# Patient Record
Sex: FEMALE | Race: BLACK OR AFRICAN AMERICAN | URBAN - METROPOLITAN AREA
[De-identification: names, ages, dates, MRNs, and addresses within clinical notes are randomized per-mention and may not be internally consistent; named-entity substitution may affect disease eponyms.]

---

## 2018-02-21 ENCOUNTER — HISTORICAL (OUTPATIENT)
Dept: LAB | Facility: HOSPITAL | Age: 48
End: 2018-02-21

## 2018-02-21 LAB
INR PPP: 3.2 (ref 2–3)
PROTHROMBIN TIME: 32.6 SECOND(S) (ref 9.3–11.4)

## 2018-03-21 ENCOUNTER — HISTORICAL (OUTPATIENT)
Dept: LAB | Facility: HOSPITAL | Age: 48
End: 2018-03-21

## 2018-03-21 LAB
INR PPP: 3 (ref 2–3)
PROTHROMBIN TIME: 28.9 SECOND(S) (ref 9.3–11.4)

## 2018-04-18 ENCOUNTER — HISTORICAL (OUTPATIENT)
Dept: LAB | Facility: HOSPITAL | Age: 48
End: 2018-04-18

## 2018-04-18 LAB
INR PPP: 2.5 (ref 2–3)
PROTHROMBIN TIME: 23.5 SECOND(S) (ref 9.3–11.4)

## 2018-05-14 ENCOUNTER — HISTORICAL (OUTPATIENT)
Dept: LAB | Facility: HOSPITAL | Age: 48
End: 2018-05-14

## 2018-05-14 LAB
INR PPP: 3.7 (ref 2–3)
PROTHROMBIN TIME: 37.1 SECOND(S) (ref 9.3–11.4)

## 2018-05-22 ENCOUNTER — HISTORICAL (OUTPATIENT)
Dept: LAB | Facility: HOSPITAL | Age: 48
End: 2018-05-22

## 2018-05-22 LAB
INR PPP: 4 (ref 2–3)
PROTHROMBIN TIME: 38.8 SECOND(S) (ref 9.3–11.4)

## 2018-05-30 ENCOUNTER — HISTORICAL (OUTPATIENT)
Dept: LAB | Facility: HOSPITAL | Age: 48
End: 2018-05-30

## 2018-05-30 LAB
INR PPP: 3.7 (ref 2–3)
PROTHROMBIN TIME: 35.3 SECOND(S) (ref 9.3–11.4)

## 2018-06-04 ENCOUNTER — HISTORICAL (OUTPATIENT)
Dept: LAB | Facility: HOSPITAL | Age: 48
End: 2018-06-04

## 2018-06-04 LAB
INR PPP: 3.5 (ref 2–3)
PROTHROMBIN TIME: 33.3 SECOND(S) (ref 9.3–11.4)

## 2018-06-14 ENCOUNTER — HISTORICAL (OUTPATIENT)
Dept: LAB | Facility: HOSPITAL | Age: 48
End: 2018-06-14

## 2018-06-14 LAB
INR PPP: 2.5 (ref 2–3)
PROTHROMBIN TIME: 24.1 SECOND(S) (ref 9.3–11.4)

## 2018-06-29 ENCOUNTER — HISTORICAL (OUTPATIENT)
Dept: LAB | Facility: HOSPITAL | Age: 48
End: 2018-06-29

## 2018-06-29 LAB
INR PPP: 2.6 (ref 2–3)
PROTHROMBIN TIME: 25.2 SECOND(S) (ref 9.3–11.4)

## 2018-07-30 ENCOUNTER — HISTORICAL (OUTPATIENT)
Dept: LAB | Facility: HOSPITAL | Age: 48
End: 2018-07-30

## 2018-07-30 LAB
INR PPP: 3.1 (ref 2–3)
PROTHROMBIN TIME: 29.1 SECOND(S) (ref 9.3–11.4)

## 2018-09-04 ENCOUNTER — HISTORICAL (OUTPATIENT)
Dept: LAB | Facility: HOSPITAL | Age: 48
End: 2018-09-04

## 2018-09-04 LAB
INR PPP: 2.8 (ref 2–3)
PROTHROMBIN TIME: 26.3 SECOND(S) (ref 9.3–11.4)

## 2018-10-02 ENCOUNTER — HISTORICAL (OUTPATIENT)
Dept: LAB | Facility: HOSPITAL | Age: 48
End: 2018-10-02

## 2018-10-02 LAB
INR PPP: 1.61 (ref 0–1.27)
PROTHROMBIN TIME: 19.6 SECOND(S) (ref 12.2–14.7)

## 2018-10-08 ENCOUNTER — HISTORICAL (OUTPATIENT)
Dept: LAB | Facility: HOSPITAL | Age: 48
End: 2018-10-08

## 2018-10-08 LAB
INR PPP: 3.59 (ref 0–1.27)
PROTHROMBIN TIME: 36.9 SECOND(S) (ref 12.2–14.7)

## 2018-10-16 ENCOUNTER — HISTORICAL (OUTPATIENT)
Dept: LAB | Facility: HOSPITAL | Age: 48
End: 2018-10-16

## 2018-10-16 LAB
INR PPP: 4.24 (ref 0–1.27)
PROTHROMBIN TIME: 42.1 SECOND(S) (ref 12.2–14.7)

## 2018-10-23 ENCOUNTER — HISTORICAL (OUTPATIENT)
Dept: LAB | Facility: HOSPITAL | Age: 48
End: 2018-10-23

## 2018-10-23 LAB
INR PPP: 2.77 (ref 0–1.27)
PROTHROMBIN TIME: 30.1 SECOND(S) (ref 12.2–14.7)

## 2018-11-01 ENCOUNTER — HISTORICAL (OUTPATIENT)
Dept: LAB | Facility: HOSPITAL | Age: 48
End: 2018-11-01

## 2018-11-01 LAB
INR PPP: 3.44 (ref 0–1.27)
PROTHROMBIN TIME: 35.7 SECOND(S) (ref 12.2–14.7)

## 2018-11-19 ENCOUNTER — HISTORICAL (OUTPATIENT)
Dept: LAB | Facility: HOSPITAL | Age: 48
End: 2018-11-19

## 2018-11-19 LAB
INR PPP: 2.6 (ref 0–1.3)
PROTHROMBIN TIME: 28.3 SECOND(S) (ref 12.2–14.7)

## 2018-12-26 ENCOUNTER — HISTORICAL (OUTPATIENT)
Dept: LAB | Facility: HOSPITAL | Age: 48
End: 2018-12-26

## 2018-12-26 LAB
INR PPP: 1.6 (ref 0–1.3)
PROTHROMBIN TIME: 19.4 SECOND(S) (ref 12.2–14.7)

## 2018-12-31 ENCOUNTER — HISTORICAL (OUTPATIENT)
Dept: LAB | Facility: HOSPITAL | Age: 48
End: 2018-12-31

## 2018-12-31 LAB
INR PPP: 3.8 (ref 0–1.3)
PROTHROMBIN TIME: 38.6 SECOND(S) (ref 12.2–14.7)

## 2019-01-02 ENCOUNTER — HISTORICAL (OUTPATIENT)
Dept: LAB | Facility: HOSPITAL | Age: 49
End: 2019-01-02

## 2019-01-02 LAB
INR PPP: 4.2 (ref 0–1.3)
PROTHROMBIN TIME: 41.8 SECOND(S) (ref 12.2–14.7)

## 2019-01-04 ENCOUNTER — HISTORICAL (OUTPATIENT)
Dept: LAB | Facility: HOSPITAL | Age: 49
End: 2019-01-04

## 2019-01-04 LAB
INR PPP: 2.2 (ref 0–1.3)
PROTHROMBIN TIME: 25.1 SECOND(S) (ref 12.2–14.7)

## 2019-01-14 ENCOUNTER — HISTORICAL (OUTPATIENT)
Dept: LAB | Facility: HOSPITAL | Age: 49
End: 2019-01-14

## 2019-01-14 LAB
INR PPP: 3.2 (ref 0–1.3)
PROTHROMBIN TIME: 33.2 SECOND(S) (ref 12.2–14.7)

## 2019-01-30 ENCOUNTER — HISTORICAL (OUTPATIENT)
Dept: LAB | Facility: HOSPITAL | Age: 49
End: 2019-01-30

## 2019-01-30 LAB
INR PPP: 3 (ref 0–1.3)
PROTHROMBIN TIME: 31.6 SECOND(S) (ref 12.2–14.7)

## 2019-02-15 ENCOUNTER — HISTORICAL (OUTPATIENT)
Dept: LAB | Facility: HOSPITAL | Age: 49
End: 2019-02-15

## 2019-02-15 LAB
INR PPP: 2.1 (ref 0–1.3)
PROTHROMBIN TIME: 24 SECOND(S) (ref 12.2–14.7)

## 2019-02-25 ENCOUNTER — HISTORICAL (OUTPATIENT)
Dept: LAB | Facility: HOSPITAL | Age: 49
End: 2019-02-25

## 2019-02-25 LAB
INR PPP: 2.2 (ref 0–1.3)
PROTHROMBIN TIME: 25.3 SECOND(S) (ref 12.2–14.7)

## 2019-03-06 ENCOUNTER — HISTORICAL (OUTPATIENT)
Dept: LAB | Facility: HOSPITAL | Age: 49
End: 2019-03-06

## 2019-03-06 LAB
INR PPP: 2.5 (ref 0–1.3)
PROTHROMBIN TIME: 27.7 SECOND(S) (ref 12.2–14.7)

## 2019-03-15 ENCOUNTER — HISTORICAL (OUTPATIENT)
Dept: LAB | Facility: HOSPITAL | Age: 49
End: 2019-03-15

## 2019-03-15 LAB
INR PPP: 2.6 (ref 0–1.3)
PROTHROMBIN TIME: 28 SECOND(S) (ref 12–14)

## 2019-03-29 ENCOUNTER — HISTORICAL (OUTPATIENT)
Dept: LAB | Facility: HOSPITAL | Age: 49
End: 2019-03-29

## 2019-03-29 LAB
INR PPP: 2.7 (ref 0–1.3)
PROTHROMBIN TIME: 29.6 SECOND(S) (ref 12–14)

## 2019-05-07 ENCOUNTER — HISTORICAL (OUTPATIENT)
Dept: LAB | Facility: HOSPITAL | Age: 49
End: 2019-05-07

## 2019-05-07 LAB
INR PPP: 1.8 (ref 0–1.3)
PROTHROMBIN TIME: 21.3 SECOND(S) (ref 12–14)

## 2019-05-17 ENCOUNTER — HISTORICAL (OUTPATIENT)
Dept: LAB | Facility: HOSPITAL | Age: 49
End: 2019-05-17

## 2019-05-17 LAB
INR PPP: 3.3 (ref 2–3)
PROTHROMBIN TIME: 33.6 SEC (ref 11.7–14.5)

## 2019-05-27 ENCOUNTER — HISTORICAL (OUTPATIENT)
Dept: LAB | Facility: HOSPITAL | Age: 49
End: 2019-05-27

## 2019-05-27 LAB
INR PPP: 3.4 (ref 2–3)
PROTHROMBIN TIME: 34.8 SEC (ref 11.7–14.5)

## 2019-06-06 ENCOUNTER — HISTORICAL (OUTPATIENT)
Dept: LAB | Facility: HOSPITAL | Age: 49
End: 2019-06-06

## 2019-06-06 LAB
INR PPP: 2.6 (ref 2–3)
PROTHROMBIN TIME: 28 SEC (ref 11.7–14.5)

## 2019-07-05 ENCOUNTER — HISTORICAL (OUTPATIENT)
Dept: LAB | Facility: HOSPITAL | Age: 49
End: 2019-07-05

## 2019-07-05 LAB
INR PPP: 3.5 (ref 2–3)
PROTHROMBIN TIME: 35.5 SEC (ref 11.7–14.5)

## 2019-08-06 ENCOUNTER — HISTORICAL (OUTPATIENT)
Dept: LAB | Facility: HOSPITAL | Age: 49
End: 2019-08-06

## 2019-08-06 LAB
INR PPP: 3.4 (ref 2–3)
PROTHROMBIN TIME: 34.7 SEC (ref 11.7–14.5)

## 2019-09-06 ENCOUNTER — HISTORICAL (OUTPATIENT)
Dept: LAB | Facility: HOSPITAL | Age: 49
End: 2019-09-06

## 2019-09-06 LAB
INR PPP: 2.1 (ref 2–3)
PROTHROMBIN TIME: 23.9 SEC (ref 11.7–14.5)

## 2019-09-13 ENCOUNTER — HISTORICAL (OUTPATIENT)
Dept: LAB | Facility: HOSPITAL | Age: 49
End: 2019-09-13

## 2019-09-13 LAB
INR PPP: 4.6 (ref 2–3)
PROTHROMBIN TIME: 43.8 SEC (ref 11.7–14.5)

## 2019-09-20 ENCOUNTER — HISTORICAL (OUTPATIENT)
Dept: LAB | Facility: HOSPITAL | Age: 49
End: 2019-09-20

## 2019-09-20 LAB
INR PPP: 3.7 (ref 2–3)
PROTHROMBIN TIME: 37.4 SEC (ref 11.7–14.5)

## 2019-10-08 ENCOUNTER — HISTORICAL (OUTPATIENT)
Dept: LAB | Facility: HOSPITAL | Age: 49
End: 2019-10-08

## 2019-10-08 LAB
INR PPP: 3.5 (ref 2–3)
PROTHROMBIN TIME: 35.4 SEC (ref 11.7–14.5)

## 2019-10-25 ENCOUNTER — HISTORICAL (OUTPATIENT)
Dept: LAB | Facility: HOSPITAL | Age: 49
End: 2019-10-25

## 2019-10-25 LAB
INR PPP: 3.2 (ref 2–3)
PROTHROMBIN TIME: 32.7 SEC (ref 11.7–14.5)

## 2019-11-08 ENCOUNTER — HISTORICAL (OUTPATIENT)
Dept: LAB | Facility: HOSPITAL | Age: 49
End: 2019-11-08

## 2019-11-08 LAB
INR PPP: 3.6 (ref 2–3)
PROTHROMBIN TIME: 36.2 SEC (ref 11.7–14.5)

## 2019-11-25 ENCOUNTER — HISTORICAL (OUTPATIENT)
Dept: LAB | Facility: HOSPITAL | Age: 49
End: 2019-11-25

## 2019-11-25 LAB
INR PPP: 3.5 (ref 2–3)
PROTHROMBIN TIME: 35.4 SEC (ref 11.7–14.5)

## 2019-12-03 ENCOUNTER — HISTORICAL (OUTPATIENT)
Dept: LAB | Facility: HOSPITAL | Age: 49
End: 2019-12-03

## 2019-12-03 LAB
INR PPP: 3.6 (ref 2–3)
PROTHROMBIN TIME: 36 SEC (ref 11.7–14.5)

## 2019-12-17 ENCOUNTER — HISTORICAL (OUTPATIENT)
Dept: LAB | Facility: HOSPITAL | Age: 49
End: 2019-12-17

## 2019-12-17 LAB
INR PPP: 3.8 (ref 2–3)
PROTHROMBIN TIME: 37.5 SEC (ref 11.7–14.5)

## 2019-12-27 ENCOUNTER — HISTORICAL (OUTPATIENT)
Dept: LAB | Facility: HOSPITAL | Age: 49
End: 2019-12-27

## 2019-12-27 LAB
INR PPP: 2.6 (ref 2–3)
PROTHROMBIN TIME: 28.1 SEC (ref 11.7–14.5)

## 2020-01-25 ENCOUNTER — HISTORICAL (OUTPATIENT)
Dept: LAB | Facility: HOSPITAL | Age: 50
End: 2020-01-25

## 2020-01-25 LAB
INR PPP: 4 (ref 2–3)
PROTHROMBIN TIME: 39.1 SEC (ref 11.7–14.5)

## 2020-01-29 ENCOUNTER — HISTORICAL (OUTPATIENT)
Dept: LAB | Facility: HOSPITAL | Age: 50
End: 2020-01-29

## 2020-01-29 LAB
INR PPP: 2.3 (ref 2–3)
PROTHROMBIN TIME: 25.3 SEC (ref 11.7–14.5)

## 2020-02-15 ENCOUNTER — HISTORICAL (OUTPATIENT)
Dept: LAB | Facility: HOSPITAL | Age: 50
End: 2020-02-15

## 2020-02-15 LAB
INR PPP: 3.5 (ref 2–3)
PROTHROMBIN TIME: 35.7 SEC (ref 11.7–14.5)

## 2020-03-02 ENCOUNTER — HISTORICAL (OUTPATIENT)
Dept: LAB | Facility: HOSPITAL | Age: 50
End: 2020-03-02

## 2020-03-02 LAB
INR PPP: 3.4 (ref 2–3)
PROTHROMBIN TIME: 34.5 SEC (ref 11.7–14.5)

## 2020-03-23 ENCOUNTER — HISTORICAL (OUTPATIENT)
Dept: LAB | Facility: HOSPITAL | Age: 50
End: 2020-03-23

## 2020-03-23 LAB
INR PPP: 3.7 (ref 2–3)
PROTHROMBIN TIME: 36.7 SEC (ref 11.7–14.5)

## 2020-04-06 ENCOUNTER — HISTORICAL (OUTPATIENT)
Dept: LAB | Facility: HOSPITAL | Age: 50
End: 2020-04-06

## 2020-04-06 LAB
INR PPP: 2.7 (ref 2–3)
PROTHROMBIN TIME: 28.7 SEC (ref 11.7–14.5)

## 2020-05-02 ENCOUNTER — HISTORICAL (OUTPATIENT)
Dept: LAB | Facility: HOSPITAL | Age: 50
End: 2020-05-02

## 2020-05-02 LAB
INR PPP: 2.3 (ref 2–3)
PROTHROMBIN TIME: 25.2 SEC (ref 11.7–14.5)

## 2020-05-16 ENCOUNTER — HISTORICAL (OUTPATIENT)
Dept: LAB | Facility: HOSPITAL | Age: 50
End: 2020-05-16

## 2020-05-16 LAB
INR PPP: 3.6 (ref 2–3)
PROTHROMBIN TIME: 36.6 SEC (ref 11.7–14.5)

## 2020-05-27 ENCOUNTER — HISTORICAL (OUTPATIENT)
Dept: LAB | Facility: HOSPITAL | Age: 50
End: 2020-05-27

## 2020-05-27 LAB
INR PPP: 2.4 (ref 2–3)
PROTHROMBIN TIME: 26 SEC (ref 11.7–14.5)

## 2020-06-04 ENCOUNTER — HISTORICAL (OUTPATIENT)
Dept: LAB | Facility: HOSPITAL | Age: 50
End: 2020-06-04

## 2020-06-04 LAB
INR PPP: 2.9 (ref 2–3)
PROTHROMBIN TIME: 30.6 SEC (ref 11.7–14.5)

## 2020-06-24 ENCOUNTER — HISTORICAL (OUTPATIENT)
Dept: LAB | Facility: HOSPITAL | Age: 50
End: 2020-06-24

## 2020-06-24 LAB
INR PPP: 2.2 (ref 2–3)
PROTHROMBIN TIME: 24.7 SEC (ref 11.7–14.5)

## 2020-07-31 ENCOUNTER — HISTORICAL (OUTPATIENT)
Dept: LAB | Facility: HOSPITAL | Age: 50
End: 2020-07-31

## 2020-07-31 LAB
INR PPP: 3.1 (ref 2–3)
PROTHROMBIN TIME: 31.9 SEC (ref 11.7–14.5)

## 2020-09-16 ENCOUNTER — HOSPITAL ENCOUNTER (OUTPATIENT)
Dept: MEDSURG UNIT | Facility: HOSPITAL | Age: 50
End: 2020-09-17
Attending: SPECIALIST | Admitting: SPECIALIST

## 2020-09-16 LAB
ABS NEUT (OLG): 3.81 X10(3)/MCL (ref 2.1–9.2)
ALBUMIN SERPL-MCNC: 3.3 GM/DL (ref 3.5–5)
ALBUMIN/GLOB SERPL: 1 RATIO (ref 1.1–2)
ALP SERPL-CCNC: 76 UNIT/L (ref 40–150)
ALT SERPL-CCNC: 121 UNIT/L (ref 0–55)
APTT PPP: 50.5 SECOND(S) (ref 23.2–33.7)
AST SERPL-CCNC: 59 UNIT/L (ref 5–34)
BASOPHILS # BLD AUTO: 0.1 X10(3)/MCL (ref 0–0.2)
BASOPHILS NFR BLD AUTO: 2 %
BILIRUB SERPL-MCNC: 0.4 MG/DL
BILIRUBIN DIRECT+TOT PNL SERPL-MCNC: 0.2 MG/DL (ref 0–0.5)
BILIRUBIN DIRECT+TOT PNL SERPL-MCNC: 0.2 MG/DL (ref 0–0.8)
BUN SERPL-MCNC: 9.4 MG/DL (ref 9.8–20.1)
CALCIUM SERPL-MCNC: 8.3 MG/DL (ref 8.4–10.2)
CHLORIDE SERPL-SCNC: 104 MMOL/L (ref 98–107)
CO2 SERPL-SCNC: 27 MMOL/L (ref 22–29)
CREAT SERPL-MCNC: 0.72 MG/DL (ref 0.55–1.02)
EOSINOPHIL # BLD AUTO: 0.2 X10(3)/MCL (ref 0–0.9)
EOSINOPHIL NFR BLD AUTO: 3 %
ERYTHROCYTE [DISTWIDTH] IN BLOOD BY AUTOMATED COUNT: 18.4 % (ref 11.5–17)
GLOBULIN SER-MCNC: 3.4 GM/DL (ref 2.4–3.5)
GLUCOSE SERPL-MCNC: 89 MG/DL (ref 74–100)
HCT VFR BLD AUTO: 34.1 % (ref 37–47)
HCT VFR BLD AUTO: 34.3 % (ref 37–47)
HGB BLD-MCNC: 10.4 GM/DL (ref 12–16)
HGB BLD-MCNC: 10.5 GM/DL (ref 12–16)
INR PPP: 4.3 (ref 0–1.3)
LYMPHOCYTES # BLD AUTO: 1.3 X10(3)/MCL (ref 0.6–4.6)
LYMPHOCYTES NFR BLD AUTO: 21 %
MCH RBC QN AUTO: 25.8 PG (ref 27–31)
MCHC RBC AUTO-ENTMCNC: 30.3 GM/DL (ref 33–36)
MCV RBC AUTO: 85.1 FL (ref 80–94)
MONOCYTES # BLD AUTO: 0.7 X10(3)/MCL (ref 0.1–1.3)
MONOCYTES NFR BLD AUTO: 11 %
NEUTROPHILS # BLD AUTO: 3.81 X10(3)/MCL (ref 2.1–9.2)
NEUTROPHILS NFR BLD AUTO: 63 %
PLATELET # BLD AUTO: 499 X10(3)/MCL (ref 130–400)
PMV BLD AUTO: 9.8 FL (ref 9.4–12.4)
POTASSIUM SERPL-SCNC: 3.6 MMOL/L (ref 3.5–5.1)
PROT SERPL-MCNC: 6.7 GM/DL (ref 6.4–8.3)
PROTHROMBIN TIME: 41.1 SECOND(S) (ref 11.1–13.7)
RBC # BLD AUTO: 4.03 X10(6)/MCL (ref 4.2–5.4)
SODIUM SERPL-SCNC: 137 MMOL/L (ref 136–145)
WBC # SPEC AUTO: 6 X10(3)/MCL (ref 4.5–11.5)

## 2020-09-17 LAB
HCT VFR BLD AUTO: 33.7 % (ref 37–47)
HCT VFR BLD AUTO: 36.1 % (ref 37–47)
HGB BLD-MCNC: 10.1 GM/DL (ref 12–16)
HGB BLD-MCNC: 10.8 GM/DL (ref 12–16)
INR PPP: 4.5 (ref 0–1.3)
PROTHROMBIN TIME: 42.4 SECOND(S) (ref 11.1–13.7)

## 2020-09-21 ENCOUNTER — HISTORICAL (OUTPATIENT)
Dept: LAB | Facility: HOSPITAL | Age: 50
End: 2020-09-21

## 2020-09-21 LAB
INR PPP: 2.4 (ref 2–3)
PROTHROMBIN TIME: 25.9 SEC (ref 11.7–14.5)

## 2020-10-05 ENCOUNTER — HISTORICAL (OUTPATIENT)
Dept: LAB | Facility: HOSPITAL | Age: 50
End: 2020-10-05

## 2020-10-05 LAB
INR PPP: 4.1 (ref 2–3)
PROTHROMBIN TIME: 40.2 SEC (ref 11.7–14.5)

## 2022-04-11 ENCOUNTER — HISTORICAL (OUTPATIENT)
Dept: ADMINISTRATIVE | Facility: HOSPITAL | Age: 52
End: 2022-04-11

## 2022-04-28 VITALS
DIASTOLIC BLOOD PRESSURE: 92 MMHG | HEIGHT: 64 IN | SYSTOLIC BLOOD PRESSURE: 144 MMHG | BODY MASS INDEX: 30.22 KG/M2 | WEIGHT: 177 LBS

## 2022-04-30 NOTE — ED PROVIDER NOTES
Patient:   Meera Barr            MRN: [id number]            FIN: [id number]               Age:   50 years     Sex:  Female     :  1970   Associated Diagnoses:   None   Author:   Yani Knapp DO      Addendum   I had face-to-face time with this patient.  She reports bleeding from her surgical incision on her left breast since hitting it this morning. She had a hematoma drained 2 weeks ago and takes Coumadin due to valve replacement. On exam, alert and oriented x 3, no distress, unlabored respirations, pressure dressing in place to breast, ecchymosis to left breast. I agree with the above assessment and plan for admission as documented.

## 2022-04-30 NOTE — H&P
Patient:   Meera Barr            MRN: [id number]            FIN: [id number]               Age:   50 years     Sex:  Female     :  1970   Associated Diagnoses:   None   Author:   Silvana CAAL, Molina TY      Postoperative Information   Patient is a 49 yo F who presents approximately 1.5 weeks after a drainage of a breast hematoma.  Patient had a mechanical valve placed 5 years ago by Dr. Boss and presented initially after her INR was supratherapeutic and suffered a spontaneous bleed of her chest wall.  She presented to the ER overnight due to some wound dehiscence and serosanguineous drainage from her incision.      Review of Systems   Negative except for above      Physical Examination   No acute distress.   RR  No increased work of breathing  Approximately 1 cm incision of the medial left breast  Scant amount of old blood draining      Impression and Plan   49 yo F s/p drainage of a left breast hematoma presenting with drainage from incision  -No acute intervention  -D/C home  -Continue taking keflex as prescribed in clinic    Molina Pina MD  PGY IV

## 2022-04-30 NOTE — ED PROVIDER NOTES
Patient:   Meera Barr            MRN: [id number]            FIN: [id number]               Age:   50 years     Sex:  Female     :  1970   Associated Diagnoses:   Post surgical complication   Author:   Hetal Sena      Basic Information   Time seen: Date & time 2020 15:20:00.   Additional information: Chief Complaint from Nursing Triage Note : Chief Complaint   2020 14:41 CDT      Chief Complaint           sx on L breast on  . reports leaning on desk today and felt blood running down. released from hospital . EMSn reports dark red blood    9/15/2020 8:04 CDT       Chief Complaint           S/p Drainage of left breast hematoma(2020)  .      History of Present Illness   The patient presents for 49 y/o female who presents with having left breast hematoma drained on  by dr. Boss. she saw keith michael yesterday in office, small dehiscence on lateral aspect of surgical incision. she states all was going well, just started cephalexin. hasn't had much drainage and then today was leaning against her arm on the counter and it may have been too much pressure on area and it started draining a moderate amount of dark blood and it would not stop. she is on coumadin daily 10mg and last INR was friday and it was 2.1. she states no weakness/dizziness/pain. just can't get it to stop draining blood. .  Previous treatment: inpatient surgery 2020 .  Post op course: improving.  Incision complaints: dark blood draining out .  Risk factors consist of recent breast hematoma drained.  Therapy today: none.  Associated symptoms: none.  Additional history: none.        Review of Systems   Constitutional symptoms:  No fever, no chills, no weakness.    Skin symptoms:  left breast surgical incision draining blood, no rash, no pruritus.    Respiratory symptoms:  No shortness of breath, no orthopnea, no cough.    Cardiovascular symptoms:  No chest pain, no palpitations, no peripheral  edema.    Gastrointestinal symptoms:  No abdominal pain, no nausea, no vomiting.    Neurologic symptoms:  No headache, no dizziness.              Additional review of systems information: All other systems reviewed and otherwise negative.      Health Status   Allergies:    Allergic Reactions (Selected)  No Known Allergies,    Allergies (1) Active Reaction  No Known Allergies None Documented  .   Medications:  (Selected)   Prescriptions  Prescribed  Coumadin 5 mg oral tablet: 10 mg = 2 tab(s), Oral, Daily, # 30 tab(s), 3 Refill(s)  Keflex 500 mg oral capsule: 500 mg = 1 cap(s), Oral, q12hr, X 7 day(s), # 14 cap(s), 0 Refill(s), Pharmacy: [id number], 163, cm, Height/Length Dosing, 09/15/20 8:04:00 CDT, 82, kg, Weight Dosing, 09/15/20 8:06:00 CDT  Protonix 40 mg ORAL enteric coated tablet: 40 mg = 1 tab(s), Oral, Daily, # 30 tab(s), 0 Refill(s), Pharmacy: [id number], 162, cm, Height/Length Dosing, 09/02/20 22:04:00 CDT, 78, kg, Weight Dosing, 09/02/20 22:04:00 CDT  Documented Medications  Documented  hydrochlorothiazide 25 mg oral tablet: 25 mg = 1 tab(s), Oral, Daily.      Past Medical/ Family/ Social History   Medical history:    Resolved  Aortic stenosis ([id number]):  Resolved.  Congestive heart failure ([id number]):  Resolved..   Surgical history:    Incision & Drainage Major (.) performed by Bhaskar Boss IV, MD on 9/5/2020 at 50 Years.  Comments:  9/5/2020 10:15 CDT - Latricia Mary RN  auto-populated from documented surgical case  Minimally Invasive Aortic Valve Replacement (.) performed by Bhaskar Boss IV, MD on 6/25/2015 at 45 Years.  Comments:  6/25/2015 10:31 CDT - Shar Rubio RN  auto-populated from documented surgical case  Laparoscopy, abdomen, peritoneum, and omentum, diagnostic, with or without collection of specimen(s) by brushing or washing (separate procedure) (CPT4 85981)..   Family history:    History is unknown..    Social history: Occupation: Employed, Family/social situation: .   Problem list:    Active Problems (2)  Breast hematoma   Obesity   .      Physical Examination               Vital Signs   Vital Signs   9/16/2020 15:01 CDT      Peripheral Pulse Rate     89 bpm                             Respiratory Rate          16 br/min                             SpO2                      100 %                             Oxygen Therapy            Room air                             Systolic Blood Pressure   145 mmHg  HI                             Diastolic Blood Pressure  90 mmHg                             Mean Arterial Pressure, Cuff              108 mmHg    9/16/2020 14:41 CDT      Temperature Oral          37.0 DegC                             Temperature Oral (calculated)             98.60 DegF                             Peripheral Pulse Rate     99 bpm                             Respiratory Rate          14 br/min                             SpO2                      97 %                             Oxygen Therapy            Room air                             Systolic Blood Pressure   173 mmHg  HI                             Diastolic Blood Pressure  105 mmHg  HI    9/15/2020 8:04 CDT       Peripheral Pulse Rate     98 bpm                             Systolic Blood Pressure   132 mmHg                             Diastolic Blood Pressure  86 mmHg  .      Vital Signs (last 24 hrs)_____  Last Charted___________  Temp Oral     37.0 DegC  (SEP 16 14:41)  Heart Rate Peripheral   89 bpm  (SEP 16 15:01)  Resp Rate         16 br/min  (SEP 16 15:01)  SBP      H 145mmHg  (SEP 16 15:01)  DBP      90 mmHg  (SEP 16 15:01)  SpO2      100 %  (SEP 16 15:01)  .   Measurements   9/16/2020 14:41 CDT      Weight Dosing             82.5 kg                             Weight Measured and Calculated in Lbs     181.88 lb                             Weight Estimated          82.5 kg                             Height/Length Dosing       162 cm                             Height/Length Estimated   162 cm                             Body Mass Index Estimated 31.44 kg/m2    9/15/2020 8:04 CDT       Weight Dosing             82.000 kg                             Weight Dosing             82 kg                             Weight Measured           82 kg                             Weight Measured and Calculated in Lbs     180.78 lb                             Weight Measured and Calculated in Lbs     180.78 lb                             Height/Length Dosing      163.00 cm                             Height/Length Dosing      163 cm                             Height/Length Measured    163 cm                             BSA Measured              1.93 m2                             Body Mass Index Measured  30.86 kg/m2  .   Basic Oxygen Information   9/16/2020 15:01 CDT      SpO2                      100 %                             Oxygen Therapy            Room air    9/16/2020 14:41 CDT      SpO2                      97 %                             Oxygen Therapy            Room air  .   General:  Alert, no acute distress.    Skin:  Warm, dry, pink.    Head:  Normocephalic, atraumatic.    Neck:  Supple, trachea midline.    Eye:  Normal conjunctiva.   Cardiovascular:  Regular rate and rhythm, No murmur, Normal peripheral perfusion.    Respiratory:  Lungs are clear to auscultation, respirations are non-labored, breath sounds are equal.    Chest wall:  Breast: Left breast, surgical incision noted on the underside of the left breast approx 3cm long with a small dehiscence noted on lateral aspect. incision is at the 8 o'clock abiodun. surrounding bruising noted to breasts. no redness. no pain. no warmth. .   Back:  Normal range of motion.   Musculoskeletal:  Normal ROM, normal strength.    Gastrointestinal:  Non distended.   Neurological:  Alert and oriented to person, place, time, and situation, normal sensory observed, normal motor observed, normal  speech observed, normal coordination observed.    Psychiatric:  Cooperative, appropriate mood & affect.       Medical Decision Making   Differential Diagnosis:  Postoperative surgical exam, wound dehiscence.    Documents reviewed:  Emergency department nurses' notes.   Orders  Launch Orders   Laboratory:  CMP (Order): Stat collect, 9/16/2020 15:20 CDT, Blood, Lab Collect, Print Label By Order Location, 9/16/2020 15:20 CDT  PTT (Order): Stat collect, 9/16/2020 15:20 CDT, Blood, Lab Collect, Print Label By Order Location, 9/16/2020 15:20 CDT  PT (Order): Stat collect, 9/16/2020 15:20 CDT, Blood, Lab Collect, Print Label By Order Location, 9/16/2020 15:20 CDT  CBC w/ Auto Diff (Order): Now collect, 9/16/2020 15:20 CDT, Blood, Lab Collect, Print Label By Order Location, 9/16/2020 15:20 CDT, Launch Orders   Nutrition Services:  Diet Cardiac (Order): 9/16/2020 19:11 CDT, Start Meal: Next Meal  Patient Care:  Vital Signs (Order): 9/16/2020 19:11 CDT, q4hr.    Results review:  Lab results : Lab View   9/16/2020 15:20 CDT      Sodium Lvl                137 mmol/L                             Potassium Lvl             3.6 mmol/L                             Chloride                  104 mmol/L                             CO2                       27 mmol/L                             Calcium Lvl               8.3 mg/dL  LOW                             Glucose Lvl               89 mg/dL                             BUN                       9.4 mg/dL  LOW                             Creatinine                0.72 mg/dL                             eGFR-AA                   >60  NA                             eGFR-SHAHNAZ                  >60  NA                             Bili Total                0.4 mg/dL                             Bili Direct               0.2 mg/dL                             Bili Indirect             0.20 mg/dL                             AST                       59 unit/L  HI                             ALT                        121 unit/L  HI                             Alk Phos                  76 unit/L                             Total Protein             6.7 gm/dL                             Albumin Lvl               3.3 gm/dL  LOW                             Globulin                  3.4 gm/dL                             A/G Ratio                 1.0 ratio  LOW                             PT                        41.1 second(s)  HI                             INR                       4.3  HI                             PTT                       50.5 second(s)  HI                             WBC                       6.0 x10(3)/mcL                             RBC                       4.03 x10(6)/mcL  LOW                             Hgb                       10.4 gm/dL  LOW                             Hct                       34.3 %  LOW                             Platelet                  499 x10(3)/mcL  HI                             MCV                       85.1 fL                             MCH                       25.8 pg  LOW                             MCHC                      30.3 gm/dL  LOW                             RDW                       18.4 %  HI                             MPV                       9.8 fL                             Abs Neut                  3.81 x10(3)/mcL                             Neutro Auto               63 %  NA                             Lymph Auto                21 %  NA                             Mono Auto                 11 %  NA                             Eos Auto                  3 %  NA                             Abs Eos                   0.2 x10(3)/mcL                             Basophil Auto             2 %  NA                             Abs Neutro                3.81 x10(3)/mcL                             Abs Lymph                 1.3 x10(3)/mcL                             Abs Mono                  0.7 x10(3)/mcL                             Abs Baso                   0.1 x10(3)/mcL  ,    No qualifying data available.       Reexamination/ Reevaluation   Vital signs   Basic Oxygen Information   9/16/2020 15:01 CDT      SpO2                      100 %                             Oxygen Therapy            Room air    9/16/2020 14:41 CDT      SpO2                      97 %                             Oxygen Therapy            Room air        Impression and Plan   Diagnosis   Post surgical complication (CPN50-TL [id number])      Calls-Consults   -  Recommends spoke with dr. wagner regarding dr. samuel patient of breast hematoma which was drained. discuss dark blood coming from incision. stable H&H currently. will obs, trend H&H and monitor INR.   Plan   Condition: Stable.    Disposition: Admit time  9/16/2020 18:00:00, Place in Observation Unit.    Notes: spoke with dr. lewis regarding patient and admission. she will have face to face with patient. .